# Patient Record
(demographics unavailable — no encounter records)

---

## 2024-10-25 NOTE — ASSESSMENT
[FreeTextEntry1] : Follow up  Hx of skin cancer following with oncology  High risk for DM Discussed the importance of following a low sugar/low carbohydrate diet. We'll check glucose and HbA1c today.  HLD cont Simvastatin 10 mg daily -renewed today Reinforced importance of following a low cholesterol/low fat diet we'll check Lipid panel and LFT's today  History of SLE Pt admits he does not take Plaquenil daily. pt instructed to take Plaquenil 200 mg daily  Following with Dr. Cunningham (Rheumatology) we'll check JUSTIN, dsDNA today   Blood work ordered. Follow up in one week for lab results

## 2024-10-25 NOTE — PHYSICAL EXAM
[No Acute Distress] : no acute distress [Well Nourished] : well nourished [Well Developed] : well developed [Well-Appearing] : well-appearing [Normal Sclera/Conjunctiva] : normal sclera/conjunctiva [No JVD] : no jugular venous distention [No Lymphadenopathy] : no lymphadenopathy [Supple] : supple [No Respiratory Distress] : no respiratory distress  [No Accessory Muscle Use] : no accessory muscle use [Clear to Auscultation] : lungs were clear to auscultation bilaterally [Normal Rate] : normal rate  [Regular Rhythm] : with a regular rhythm [Normal S1, S2] : normal S1 and S2 [No Murmur] : no murmur heard [Pedal Pulses Present] : the pedal pulses are present [No Edema] : there was no peripheral edema [No Extremity Clubbing/Cyanosis] : no extremity clubbing/cyanosis [Soft] : abdomen soft [Non Tender] : non-tender [Non-distended] : non-distended [Normal Posterior Cervical Nodes] : no posterior cervical lymphadenopathy [Normal Anterior Cervical Nodes] : no anterior cervical lymphadenopathy [No CVA Tenderness] : no CVA  tenderness [No Spinal Tenderness] : no spinal tenderness [No Joint Swelling] : no joint swelling [Grossly Normal Strength/Tone] : grossly normal strength/tone [No Rash] : no rash [Coordination Grossly Intact] : coordination grossly intact [No Focal Deficits] : no focal deficits [Normal Gait] : normal gait [Normal Affect] : the affect was normal [Normal Insight/Judgement] : insight and judgment were intact [de-identified] : + healed skin cancer posterior rt ear

## 2024-10-25 NOTE — PHYSICAL EXAM
[No Acute Distress] : no acute distress [Well Nourished] : well nourished [Well Developed] : well developed [Well-Appearing] : well-appearing [Normal Sclera/Conjunctiva] : normal sclera/conjunctiva [No JVD] : no jugular venous distention [No Lymphadenopathy] : no lymphadenopathy [Supple] : supple [No Respiratory Distress] : no respiratory distress  [No Accessory Muscle Use] : no accessory muscle use [Clear to Auscultation] : lungs were clear to auscultation bilaterally [Normal Rate] : normal rate  [Regular Rhythm] : with a regular rhythm [Normal S1, S2] : normal S1 and S2 [No Murmur] : no murmur heard [Pedal Pulses Present] : the pedal pulses are present [No Edema] : there was no peripheral edema [No Extremity Clubbing/Cyanosis] : no extremity clubbing/cyanosis [Soft] : abdomen soft [Non Tender] : non-tender [Non-distended] : non-distended [Normal Posterior Cervical Nodes] : no posterior cervical lymphadenopathy [Normal Anterior Cervical Nodes] : no anterior cervical lymphadenopathy [No CVA Tenderness] : no CVA  tenderness [No Spinal Tenderness] : no spinal tenderness [No Joint Swelling] : no joint swelling [Grossly Normal Strength/Tone] : grossly normal strength/tone [No Rash] : no rash [Coordination Grossly Intact] : coordination grossly intact [No Focal Deficits] : no focal deficits [Normal Gait] : normal gait [Normal Affect] : the affect was normal [Normal Insight/Judgement] : insight and judgment were intact [de-identified] : + healed skin cancer posterior rt ear

## 2024-10-25 NOTE — HISTORY OF PRESENT ILLNESS
[de-identified] : Mr. LENNIE ROD is a 79 year old male with Hx of SLE, HLD, high risk for DM, who presents for a follow up visit, Rx renewals.   Pt states he had skin cancer on his rt ear, which he had removed. Had 15 stitches. Follows with dermatology. Pt states he continues to experience sensation of his throat closing up on him at night. He followed up with ENT, who advised him to take throat lozenges and drink more fluids, pt states that has been helping. Denies any SOB, CP, abdominal pain, N/V/D, headache, dizziness, or leg swelling.  Pt admits he does not take Plaquenil daily.

## 2024-10-25 NOTE — ADDENDUM
[FreeTextEntry1] : Documented by Nikole Hinton acting as a scribe for Dr. Sujata Davidson. 10/24/2024   All medical record entries made by the scribe were at my, Dr. Sujata Davidson, direction and personally dictated by me on 10/24/2024. I have reviewed the chart and agree that the record accurately reflects my personal performance of the history, physical exam, assessment and plan. I have also personally directed, reviewed, and agreed with the chart.

## 2024-10-25 NOTE — HISTORY OF PRESENT ILLNESS
[de-identified] : Mr. LENNIE ROD is a 79 year old male with Hx of SLE, HLD, high risk for DM, who presents for a follow up visit, Rx renewals.   Pt states he had skin cancer on his rt ear, which he had removed. Had 15 stitches. Follows with dermatology. Pt states he continues to experience sensation of his throat closing up on him at night. He followed up with ENT, who advised him to take throat lozenges and drink more fluids, pt states that has been helping. Denies any SOB, CP, abdominal pain, N/V/D, headache, dizziness, or leg swelling.  Pt admits he does not take Plaquenil daily.

## 2025-02-12 NOTE — HEALTH RISK ASSESSMENT
[No] : In the past 12 months have you used drugs other than those required for medical reasons? No [[id number]] : 0 [de-identified] : used to smoke 1 pack a day [NO] : No [Reports changes in hearing] : Reports no changes in hearing [Reports changes in vision] : Reports no changes in vision [Travel to Developing Areas] : does not  travel to developing areas [TB Exposure] : is not being exposed to tuberculosis [Caregiver Concerns] : does not have caregiver concerns [ColonoscopyDate] : 2019 [ColonoscopyComments] : had cologuard done 7/2024

## 2025-02-12 NOTE — ASSESSMENT
[FreeTextEntry1] : Physical  pt had cologuard done 7/2024  nasal congestion saline mist nasal spray - Rx sent to pharmacy  Hx of skin cancer following with oncology  High risk for DM Reinforced the importance of following a low sugar/low carbohydrate diet. We'll check glucose and HbA1c today.  HLD cont Simvastatin 10 mg daily -renewed today Reinforced importance of following a low cholesterol/low fat diet we'll check Lipid panel and LFT's today  History of SLE continue to take Plaquenil 200 mg daily  Following with Dr. Cunningham (Rheumatology) we'll check JUSTIN, dsDNA, autoimmune panel today   Blood work ordered. Follow up in one week for lab results

## 2025-02-12 NOTE — ADDENDUM
[FreeTextEntry1] : Documented by Prieto Frazier acting as a scribe for Dr. Sujata Davidson. 02/11/2025  All medical records entries made by the scribe were at my, Dr. Davidson, direction and personally dictated by me on 02/11/2025. I have reviewed the chart and agree that the record accurately reflects my personal performance of the history, physical exam, assessment and plan. I have also personally directed, reviewed, and agreed with the chart.

## 2025-02-12 NOTE — HISTORY OF PRESENT ILLNESS
[de-identified] :  Mr. LENNIE ROD is a 79 year old male with hx of  SLE, HLD, high risk for DM, presenting for an annual physical.  Pt c/o nasal congestion. Denies any SOB, CP, abdominal pain, N/V/D, headache, dizziness, or leg swelling.  Reports compliance with taking his meds daily.

## 2025-02-12 NOTE — HEALTH RISK ASSESSMENT
[No] : In the past 12 months have you used drugs other than those required for medical reasons? No [[id number]] : 0 [de-identified] : used to smoke 1 pack a day [NO] : No [Reports changes in hearing] : Reports no changes in hearing [Reports changes in vision] : Reports no changes in vision [Travel to Developing Areas] : does not  travel to developing areas [TB Exposure] : is not being exposed to tuberculosis [Caregiver Concerns] : does not have caregiver concerns [ColonoscopyDate] : 2019 [ColonoscopyComments] : had cologuard done 7/2024

## 2025-02-12 NOTE — HISTORY OF PRESENT ILLNESS
[de-identified] :  Mr. LENNIE ROD is a 79 year old male with hx of  SLE, HLD, high risk for DM, presenting for an annual physical.  Pt c/o nasal congestion. Denies any SOB, CP, abdominal pain, N/V/D, headache, dizziness, or leg swelling.  Reports compliance with taking his meds daily.

## 2025-06-10 NOTE — PHYSICAL EXAM
[Well Nourished] : well nourished [No Acute Distress] : no acute distress [Well Developed] : well developed [Well-Appearing] : well-appearing [Normal Sclera/Conjunctiva] : normal sclera/conjunctiva [Normal Outer Ear/Nose] : the outer ears and nose were normal in appearance [Normal TMs] : both tympanic membranes were normal [No JVD] : no jugular venous distention [No Lymphadenopathy] : no lymphadenopathy [Supple] : supple [No Respiratory Distress] : no respiratory distress  [No Accessory Muscle Use] : no accessory muscle use [Clear to Auscultation] : lungs were clear to auscultation bilaterally [Normal Rate] : normal rate  [Regular Rhythm] : with a regular rhythm [Normal S1, S2] : normal S1 and S2 [No Murmur] : no murmur heard [Pedal Pulses Present] : the pedal pulses are present [No Edema] : there was no peripheral edema [No Extremity Clubbing/Cyanosis] : no extremity clubbing/cyanosis [Soft] : abdomen soft [Non Tender] : non-tender [Non-distended] : non-distended [Normal Posterior Cervical Nodes] : no posterior cervical lymphadenopathy [Normal Anterior Cervical Nodes] : no anterior cervical lymphadenopathy [No CVA Tenderness] : no CVA  tenderness [No Joint Swelling] : no joint swelling [No Spinal Tenderness] : no spinal tenderness [Grossly Normal Strength/Tone] : grossly normal strength/tone [No Rash] : no rash [No Focal Deficits] : no focal deficits [Coordination Grossly Intact] : coordination grossly intact [Normal Gait] : normal gait [Normal Affect] : the affect was normal [Normal Insight/Judgement] : insight and judgment were intact

## 2025-06-10 NOTE — PHYSICAL EXAM
[Well Nourished] : well nourished [No Acute Distress] : no acute distress [Well Developed] : well developed [Well-Appearing] : well-appearing [Normal Sclera/Conjunctiva] : normal sclera/conjunctiva [Normal Outer Ear/Nose] : the outer ears and nose were normal in appearance [Normal TMs] : both tympanic membranes were normal [No JVD] : no jugular venous distention [No Lymphadenopathy] : no lymphadenopathy [Supple] : supple [No Respiratory Distress] : no respiratory distress  [No Accessory Muscle Use] : no accessory muscle use [Clear to Auscultation] : lungs were clear to auscultation bilaterally [Normal Rate] : normal rate  [Regular Rhythm] : with a regular rhythm [Normal S1, S2] : normal S1 and S2 [No Murmur] : no murmur heard [Pedal Pulses Present] : the pedal pulses are present [No Edema] : there was no peripheral edema [No Extremity Clubbing/Cyanosis] : no extremity clubbing/cyanosis [Soft] : abdomen soft [Non-distended] : non-distended [Non Tender] : non-tender [Normal Anterior Cervical Nodes] : no anterior cervical lymphadenopathy [Normal Posterior Cervical Nodes] : no posterior cervical lymphadenopathy [No CVA Tenderness] : no CVA  tenderness [No Joint Swelling] : no joint swelling [No Spinal Tenderness] : no spinal tenderness [Grossly Normal Strength/Tone] : grossly normal strength/tone [No Rash] : no rash [Coordination Grossly Intact] : coordination grossly intact [No Focal Deficits] : no focal deficits [Normal Gait] : normal gait [Normal Affect] : the affect was normal [Normal Insight/Judgement] : insight and judgment were intact